# Patient Record
Sex: FEMALE | Race: WHITE | NOT HISPANIC OR LATINO | Employment: OTHER | URBAN - METROPOLITAN AREA
[De-identification: names, ages, dates, MRNs, and addresses within clinical notes are randomized per-mention and may not be internally consistent; named-entity substitution may affect disease eponyms.]

---

## 2017-08-21 ENCOUNTER — HOSPITAL ENCOUNTER (EMERGENCY)
Facility: CLINIC | Age: 42
Discharge: HOME | End: 2017-08-21
Attending: EMERGENCY MEDICINE | Admitting: EMERGENCY MEDICINE
Payer: MEDICAID

## 2017-08-21 VITALS
SYSTOLIC BLOOD PRESSURE: 138 MMHG | TEMPERATURE: 97.8 F | DIASTOLIC BLOOD PRESSURE: 95 MMHG | HEIGHT: 64 IN | RESPIRATION RATE: 16 BRPM | HEART RATE: 61 BPM | OXYGEN SATURATION: 97 % | WEIGHT: 170 LBS | BODY MASS INDEX: 29.02 KG/M2

## 2017-08-21 DIAGNOSIS — R42 VERTIGO: ICD-10-CM

## 2017-08-21 PROCEDURE — 99285 EMERGENCY DEPT VISIT HI MDM: CPT | Mod: 25

## 2017-08-21 PROCEDURE — 96361 HYDRATE IV INFUSION ADD-ON: CPT

## 2017-08-21 PROCEDURE — 96374 THER/PROPH/DIAG INJ IV PUSH: CPT

## 2017-08-21 PROCEDURE — 25000128 H RX IP 250 OP 636: Performed by: EMERGENCY MEDICINE

## 2017-08-21 PROCEDURE — 25000131 ZZH RX MED GY IP 250 OP 636 PS 637: Performed by: EMERGENCY MEDICINE

## 2017-08-21 RX ORDER — MECLIZINE HYDROCHLORIDE 25 MG/1
25 TABLET ORAL EVERY 6 HOURS PRN
Qty: 30 TABLET | Refills: 1 | Status: SHIPPED | OUTPATIENT
Start: 2017-08-21 | End: 2019-06-06

## 2017-08-21 RX ORDER — GLATIRAMER ACETATE 20 MG/ML
40 INJECTION, SOLUTION SUBCUTANEOUS
COMMUNITY
End: 2019-06-06

## 2017-08-21 RX ORDER — MECLIZINE HYDROCHLORIDE 25 MG/1
25 TABLET ORAL ONCE
Status: COMPLETED | OUTPATIENT
Start: 2017-08-21 | End: 2017-08-21

## 2017-08-21 RX ORDER — DIAZEPAM 10 MG/2ML
5 INJECTION, SOLUTION INTRAMUSCULAR; INTRAVENOUS ONCE
Status: COMPLETED | OUTPATIENT
Start: 2017-08-21 | End: 2017-08-21

## 2017-08-21 RX ADMIN — DIAZEPAM 5 MG: 5 INJECTION, SOLUTION INTRAMUSCULAR; INTRAVENOUS at 12:00

## 2017-08-21 RX ADMIN — MECLIZINE 25 MG: 25 TABLET ORAL at 11:21

## 2017-08-21 RX ADMIN — SODIUM CHLORIDE 1000 ML: 9 INJECTION, SOLUTION INTRAVENOUS at 11:22

## 2017-08-21 ASSESSMENT — ENCOUNTER SYMPTOMS
COUGH: 0
SPEECH DIFFICULTY: 0
ABDOMINAL PAIN: 0
VOMITING: 0
DIZZINESS: 1
NAUSEA: 1
FEVER: 0

## 2017-08-21 NOTE — ED PROVIDER NOTES
"  History     Chief Complaint:  Dizziness       HPI   Anmol Silverman is a 41 year old female with a history of MS and anxiety who presents to the emergency department for evaluation of dizziness. The patient reports she has been intermittently dizzy and unsteady on her feet this morning while getting ready for work, explaining that she feels like she is \" constantly moving and has the spins.\" This is exacerbated by movement of her head and bending forward. She additionally has been nauseated with this dizziness, though denies any episodes of vomiting. She denies any recent visional changes, tinnitus, difficulty speaking or swallowing, cough, chest pain, or abdominal pain.    Symptoms began acutely while driving and turning head, severe, comes in by nausea.  No chest pain no diplopia no double vision no dysarthria.    Allergies:  Adhesive Tape    Medications:    Glatiramer    Past Medical History:    Anxiety  Bartholin Gland cyst  Depression   Gestational diabetes mellitus  Hemorrhoids  MS    Past Surgical History:    L4-L5 fusion   Bartholin gland  total hip arthroplasty  Right eye strabismus procedure  Bilateral laboral repairs  Tumor removal of L gluteal     Family History:    Depression  Diabetes  Hypertension    Social History:  Marital Status:   [2]  Current everyday smoker  Negative for alcohol use    Review of Systems   Constitutional: Negative for fever.   HENT: Negative for tinnitus.    Eyes: Negative for visual disturbance.   Respiratory: Negative for cough.    Cardiovascular: Negative for chest pain.   Gastrointestinal: Positive for nausea. Negative for abdominal pain and vomiting.   Neurological: Positive for dizziness. Negative for speech difficulty.   All other systems reviewed and are negative.    Physical Exam   First Vitals:  BP: (!) 138/95  Pulse: 61  Temp: 97.8  F (36.6  C)  Resp: 16  Height: 162.6 cm (5' 4\")  Weight: 77.1 kg (170 lb)  SpO2: 97 %      Physical Exam  General: Pt seen on " [facility], pleasant, cooperative, and alert to conversation  Eyes: Tracking well, clear conj BL  ENT: MMM, neck supple with no TTP  Abd: Soft, non tender, no guarding, no rebound. Non distended  Skin: No skin changes, no edema or rash present to extremities  Neuro: Clear speech and no facial droop.  CN 2 - 12 in tact BL  BUE with SILT and 2PD in tact  BUE with 5/5 motor throughtout  BLE with SILT and 2PD in tact  BLE with 5/5 motor throughout  No clonus  Normal FTN  Normal BRIAN  Ambulatory with no issues.   Psych: Not RIS, no e/o AH/VH      Emergency Department Course   Interventions:  1121  Antivert tablet 25 mg OP  1122  NS 1L IV  1200  Valium 5 mg IV    Emergency Department Course:  Nursing notes and vitals reviewed. 11:08 I performed an exam of the patient as documented above.     1233 I rechecked the patient and discussed her symptoms after the above interventions.     Findings and plan explained to the Patient. Patient discharged home with instructions regarding supportive care, medications, and reasons to return. The importance of close follow-up was reviewed. The patient was prescribed meclizine.     Impression & Plan    Medical Decision Makin-year-old female with likely peripheral vertigo.  Patient has no cardiovascular risk factors or stroke risk factors, no Ds in normal neurological exam with no cerebellar signs.  Also reassuring is the severity of patient's symptoms and the fact that they are exacerbated by movement.  Symptoms ameliorated here with standard anti-vertigo medication.  On my reassessment patient up and about ambulatory and states feels okay going home and follow-up with her regular doctor.  I do not feel that admission and advanced imaging is indicated.  Patient is otherwise negative review of systems, soft abdomen, and symptoms that do fit well with the diagnosis of peripheral vertigo.  I'll discharge very clear return to ED precautions and red flakes when the call  911.    Diagnosis:    ICD-10-CM   1. Vertigo R42       Disposition:  discharged to home    Discharge Medications:   Details   meclizine (ANTIVERT) 25 MG tablet Take 1 tablet (25 mg) by mouth every 6 hours as needed for dizziness, Disp-30 tablet, R-1, Local Print        IRikki, chica serving as a scribe on 8/21/2017 at 11:08 AM to personally document services performed by Dwayne Zafar MD based on my observations and the provider's statements to me.     Rikki Ordonez  8/21/2017    EMERGENCY DEPARTMENT       Dwayne Zafar MD  08/21/17 7638

## 2017-08-21 NOTE — ED AVS SNAPSHOT
Emergency Department    [address] [place] [address]    Phone:  [phone]    Fax:  [phone]                                       Anmol Silverman   MRN: [id number]    Department:   Emergency Department   Date of Visit:  8/21/2017           After Visit Summary Signature Page     I have received my discharge instructions, and my questions have been answered. I have discussed any challenges I see with this plan with the nurse or doctor.    ..........................................................................................................................................  Patient/Patient Representative Signature      ..........................................................................................................................................  Patient Representative Print Name and Relationship to Patient    ..................................................               ................................................  Date                                            Time    ..........................................................................................................................................  Reviewed by Signature/Title    ...................................................              ..............................................  Date                                                            Time

## 2017-08-21 NOTE — DISCHARGE INSTRUCTIONS
Possible Causes of Dizziness or Fainting    Dizziness and fainting can have many causes. Below are some examples of possible causes your healthcare provider will look to rule out.  Benign paroxysmal positional vertigo (BPPV)  BPPV results when calcium crystals inside the inner ear shift into the wrong position. BPPV causes episodes of vertigo (a spinning sensation). Episodes most often happen when the head is moved in a certain way. This is more common in people 65 and older.   Infection or inflammation  The semicircular canals of the ear may become infected or inflamed. In this case, they can send the wrong balance signals. This can cause vertigo.  Meniere disease  Meniere disease happens when there is too much fluid in the semicircular canals. This can cause vertigo. It also can cause hearing problems and buzzing or ringing in the ears (called tinnitus). You may also have a feeling of pressure or fullness in the ear.  Syncope  Syncope is fainting that happens when the brain doesn t get enough oxygen-rich blood. It can be caused by low heart rate or low blood pressure. This is called vasovagal syncope. It can also be caused by sitting or standing up too quickly. This is called orthostatic hypotension. Syncope may also be due to a heart valve problem, an abnormal heart rhythm, or other heart problems. Dizziness can also happen from stroke, hemorrhage in the brain, or other problems in the brain. Your healthcare provider may do certain tests to rule out these conditions.  Other causes  Other causes include:    Medicines. Certain medicines can cause dizziness and even fainting. In some cases, stopping a medicine too quickly can lead to withdrawal symptoms, including dizziness and fainting.    Anxiety. Being anxious can lead to breathing changes, such as hyperventilation. These can lead to dizziness and fainting.  Additional causes for dizziness and fainting also exist. Talk to your healthcare provider for more  information.     Date Last Reviewed: 10/6/2015    [id number] [employer]. [address] [place] [address], [place] [address]. All rights reserved. This information is not intended as a substitute for professional medical care. Always follow your healthcare professional's instructions.

## 2017-08-21 NOTE — ED NOTES
Bed: ED01  Expected date:   Expected time:   Means of arrival:   Comments:  [facility] [place] - 41F dizziness/weakness eta 1046

## 2017-08-21 NOTE — ED AVS SNAPSHOT
Emergency Department    [address] [facility] [address]    Phone:  [phone]    Fax:  [phone]                                       Anmol Silverman   MRN: [id number]    Department:   Emergency Department   Date of Visit:  8/21/2017           Patient Information     Date Of Birth          1975        Your diagnoses for this visit were:     Vertigo        You were seen by Dwayne Zafar MD.      Follow-up Information     Follow up with Enrrique, Fanny Peters In 3 days.    Why:  As needed    Contact information:    [address] [facility] [address]  [phone]          Follow up with  Emergency Department.    Specialty:  EMERGENCY MEDICINE    Why:  If symptoms worsen    Contact information:    [address] [facility] [id number] [phone]        Discharge Instructions         Possible Causes of Dizziness or Fainting    Dizziness and fainting can have many causes. Below are some examples of possible causes your healthcare provider will look to rule out.  Benign paroxysmal positional vertigo (BPPV)  BPPV results when calcium crystals inside the inner ear shift into the wrong position. BPPV causes episodes of vertigo (a spinning sensation). Episodes most often happen when the head is moved in a certain way. This is more common in people 65 and older.   Infection or inflammation  The semicircular canals of the ear may become infected or inflamed. In this case, they can send the wrong balance signals. This can cause vertigo.  Meniere disease  Meniere disease happens when there is too much fluid in the semicircular canals. This can cause vertigo. It also can cause hearing problems and buzzing or ringing in the ears (called tinnitus). You may also have a feeling of pressure or fullness in the ear.  Syncope  Syncope is fainting that happens when the brain doesn t get enough oxygen-rich blood. It can be caused by low heart rate or low  blood pressure. This is called vasovagal syncope. It can also be caused by sitting or standing up too quickly. This is called orthostatic hypotension. Syncope may also be due to a heart valve problem, an abnormal heart rhythm, or other heart problems. Dizziness can also happen from stroke, hemorrhage in the brain, or other problems in the brain. Your healthcare provider may do certain tests to rule out these conditions.  Other causes  Other causes include:    Medicines. Certain medicines can cause dizziness and even fainting. In some cases, stopping a medicine too quickly can lead to withdrawal symptoms, including dizziness and fainting.    Anxiety. Being anxious can lead to breathing changes, such as hyperventilation. These can lead to dizziness and fainting.  Additional causes for dizziness and fainting also exist. Talk to your healthcare provider for more information.     Date Last Reviewed: 10/6/2015    3201-9941 [employer]. [address] [place] [address]. All rights reserved. This information is not intended as a substitute for professional medical care. Always follow your healthcare professional's instructions.          24 Hour Appointment Hotline       To make an appointment at any [facility], call [phone] ([phone]). If you don't have a family doctor or clinic, we will help you find one. [facility] clinics are conveniently located to serve the needs of you and your family.             Review of your medicines      START taking        Dose / Directions Last dose taken    meclizine 25 MG tablet   Commonly known as:  ANTIVERT   Dose:  25 mg   Quantity:  30 tablet        Take 1 tablet (25 mg) by mouth every 6 hours as needed for dizziness   Refills:  1          Our records show that you are taking the medicines listed below. If these are incorrect, please call your family doctor or clinic.        Dose / Directions Last dose taken    blood glucose calibration solution    Quantity:  1 Bottle        Use to calibrate blood glucose monitor as needed as directed.   Refills:  0        glatiramer 20 MG/ML injection   Commonly known as:  COPAXONE   Dose:  40 mg        40 mg 3x per week   Refills:  0        KETO-DIASTIX Strp   Dose:  1 strip   Quantity:  50 each        1 strip by In Vitro route daily as needed   Refills:  prn        prenatal multivitamin plus iron 27-0.8 MG Tabs per tablet   Dose:  1 tablet        Take 1 tablet by mouth daily   Refills:  0        thin lancets   Commonly known as:  NO BRAND SPECIFIED   Quantity:  1 Box        Use to test blood sugar 4 times daily or as directed.   Refills:  prn                Prescriptions were sent or printed at these locations (1 Prescription)                   Other Prescriptions                Printed at Department/Unit printer (1 of 1)         meclizine (ANTIVERT) 25 MG tablet                Orders Needing Specimen Collection     None      Pending Results     No orders found from 8/19/2017 to 8/22/2017.            Pending Culture Results     No orders found from 8/19/2017 to 8/22/2017.            Pending Results Instructions     If you had any lab results that were not finalized at the time of your Discharge, you can call the ED Lab Result RN at [phone]. You will be contacted by this team for any positive Lab results or changes in treatment. The nurses are available 7 days a week from 10A to 6:30P.  You can leave a message 24 hours per day and they will return your call.        Test Results From Your Hospital Stay               Clinical Quality Measure: Blood Pressure Screening     Your blood pressure was checked while you were in the emergency department today. The last reading we obtained was  BP: (!) 138/95 . Please read the guidelines below about what these numbers mean and what you should do about them.  If your systolic blood pressure (the top number) is less than 120 and your diastolic blood pressure (the bottom number) is  "less than 80, then your blood pressure is normal. There is nothing more that you need to do about it.  If your systolic blood pressure (the top number) is 120-139 or your diastolic blood pressure (the bottom number) is 80-89, your blood pressure may be higher than it should be. You should have your blood pressure rechecked within a year by a primary care provider.  If your systolic blood pressure (the top number) is 140 or greater or your diastolic blood pressure (the bottom number) is 90 or greater, you may have high blood pressure. High blood pressure is treatable, but if left untreated over time it can put you at risk for heart attack, stroke, or kidney failure. You should have your blood pressure rechecked by a primary care provider within the next 4 weeks.  If your provider in the emergency department today gave you specific instructions to follow-up with your doctor or provider even sooner than that, you should follow that instruction and not wait for up to 4 weeks for your follow-up visit.        Thank you for choosing [facility]       Thank you for choosing [facility] for your care. Our goal is always to provide you with excellent care. Hearing back from our patients is one way we can continue to improve our services. Please take a few minutes to complete the written survey that you may receive in the mail after you visit with us. Thank you!        [employer] Information     [employer] lets you send messages to your doctor, view your test results, renew your prescriptions, schedule appointments and more. To sign up, go to [email or web address] . Click on \"Log in\" on the left side of the screen, which will take you to the Welcome page. Then click on \"Sign up Now\" on the right side of the page.     You will be asked to enter the access code listed below, as well as some personal information. Please follow the directions to create your username and password.     Your access code is: [email or web address]  Expires: 11/19/2017 " 12:37 PM     Your access code will  in 90 days. If you need help or a new code, please call your [place] clinic or [phone].        Care EveryWhere ID     This is your Care EveryWhere ID. This could be used by other organizations to access your [place] medical records  [id number]        Equal Access to Services     LIANA LINO : Yuan Eden, larisa pappas, jomar adams, chetan sawant . So [facility] [phone].    ATENCIÓN: Si habla español, tiene a fernandes disposición servicios gratuitos de asistencia lingüística. Llame al [phone].    We comply with applicable federal civil rights laws and [place] laws. We do not discriminate on the basis of race, color, national origin, age, disability sex, sexual orientation or gender identity.            After Visit Summary       This is your record. Keep this with you and show to your community pharmacist(s) and doctor(s) at your next visit.

## 2019-06-06 ENCOUNTER — HOSPITAL ENCOUNTER (INPATIENT)
Facility: CLINIC | Age: 44
LOS: 4 days | Discharge: HOME | DRG: 885 | End: 2019-06-10
Attending: EMERGENCY MEDICINE | Admitting: PSYCHIATRY & NEUROLOGY
Payer: COMMERCIAL

## 2019-06-06 ENCOUNTER — APPOINTMENT (OUTPATIENT)
Dept: GENERAL RADIOLOGY | Facility: CLINIC | Age: 44
DRG: 885 | End: 2019-06-06
Attending: EMERGENCY MEDICINE
Payer: COMMERCIAL

## 2019-06-06 DIAGNOSIS — T50.902A INTENTIONAL DRUG OVERDOSE, INITIAL ENCOUNTER (H): ICD-10-CM

## 2019-06-06 DIAGNOSIS — J01.10 ACUTE FRONTAL SINUSITIS, RECURRENCE NOT SPECIFIED: Primary | ICD-10-CM

## 2019-06-06 DIAGNOSIS — F32.A DEPRESSION WITH SUICIDAL IDEATION: ICD-10-CM

## 2019-06-06 DIAGNOSIS — R45.851 DEPRESSION WITH SUICIDAL IDEATION: ICD-10-CM

## 2019-06-06 LAB
ALBUMIN SERPL-MCNC: 3.6 G/DL (ref 3.4–5)
ALP SERPL-CCNC: 82 U/L (ref 40–150)
ALT SERPL W P-5'-P-CCNC: 21 U/L (ref 0–50)
AMPHETAMINES UR QL SCN: NEGATIVE
ANION GAP SERPL CALCULATED.3IONS-SCNC: 5 MMOL/L (ref 3–14)
APAP SERPL-MCNC: <2 MG/L (ref 10–20)
AST SERPL W P-5'-P-CCNC: 13 U/L (ref 0–45)
BARBITURATES UR QL: NEGATIVE
BENZODIAZ UR QL: NEGATIVE
BILIRUB SERPL-MCNC: 0.7 MG/DL (ref 0.2–1.3)
BUN SERPL-MCNC: 21 MG/DL (ref 7–30)
CALCIUM SERPL-MCNC: 8.6 MG/DL (ref 8.5–10.1)
CANNABINOIDS UR QL SCN: POSITIVE
CHLORIDE SERPL-SCNC: 110 MMOL/L (ref 94–109)
CO2 SERPL-SCNC: 26 MMOL/L (ref 20–32)
COCAINE UR QL: NEGATIVE
CREAT SERPL-MCNC: 0.64 MG/DL (ref 0.52–1.04)
ERYTHROCYTE [DISTWIDTH] IN BLOOD BY AUTOMATED COUNT: 12.6 % (ref 10–15)
GFR SERPL CREATININE-BSD FRML MDRD: >90 ML/MIN/{1.73_M2}
GLUCOSE SERPL-MCNC: 100 MG/DL (ref 70–99)
HCG UR QL: NEGATIVE
HCT VFR BLD AUTO: 41.9 % (ref 35–47)
HGB BLD-MCNC: 14.2 G/DL (ref 11.7–15.7)
INTERPRETATION ECG - MUSE: NORMAL
MCH RBC QN AUTO: 30.3 PG (ref 26.5–33)
MCHC RBC AUTO-ENTMCNC: 33.9 G/DL (ref 31.5–36.5)
MCV RBC AUTO: 89 FL (ref 78–100)
OPIATES UR QL SCN: NEGATIVE
PCP UR QL SCN: NEGATIVE
PLATELET # BLD AUTO: 271 10E9/L (ref 150–450)
POTASSIUM SERPL-SCNC: 3.7 MMOL/L (ref 3.4–5.3)
PROT SERPL-MCNC: 6.9 G/DL (ref 6.8–8.8)
RBC # BLD AUTO: 4.69 10E12/L (ref 3.8–5.2)
SODIUM SERPL-SCNC: 141 MMOL/L (ref 133–144)
TSH SERPL DL<=0.005 MIU/L-ACNC: 1.04 MU/L (ref 0.4–4)
WBC # BLD AUTO: 11 10E9/L (ref 4–11)

## 2019-06-06 PROCEDURE — 93005 ELECTROCARDIOGRAM TRACING: CPT

## 2019-06-06 PROCEDURE — 81025 URINE PREGNANCY TEST: CPT | Performed by: EMERGENCY MEDICINE

## 2019-06-06 PROCEDURE — 25000132 ZZH RX MED GY IP 250 OP 250 PS 637: Performed by: PSYCHIATRY & NEUROLOGY

## 2019-06-06 PROCEDURE — 85027 COMPLETE CBC AUTOMATED: CPT | Performed by: PSYCHIATRY & NEUROLOGY

## 2019-06-06 PROCEDURE — 80053 COMPREHEN METABOLIC PANEL: CPT | Performed by: EMERGENCY MEDICINE

## 2019-06-06 PROCEDURE — 12400000 ZZH R&B MH

## 2019-06-06 PROCEDURE — 80329 ANALGESICS NON-OPIOID 1 OR 2: CPT | Performed by: EMERGENCY MEDICINE

## 2019-06-06 PROCEDURE — 71046 X-RAY EXAM CHEST 2 VIEWS: CPT

## 2019-06-06 PROCEDURE — 90791 PSYCH DIAGNOSTIC EVALUATION: CPT

## 2019-06-06 PROCEDURE — 85027 COMPLETE CBC AUTOMATED: CPT | Performed by: EMERGENCY MEDICINE

## 2019-06-06 PROCEDURE — 80307 DRUG TEST PRSMV CHEM ANLYZR: CPT | Performed by: EMERGENCY MEDICINE

## 2019-06-06 PROCEDURE — 84443 ASSAY THYROID STIM HORMONE: CPT | Performed by: EMERGENCY MEDICINE

## 2019-06-06 PROCEDURE — 99285 EMERGENCY DEPT VISIT HI MDM: CPT | Mod: 25

## 2019-06-06 RX ORDER — SERTRALINE HYDROCHLORIDE 100 MG/1
100 TABLET, FILM COATED ORAL DAILY
Status: DISCONTINUED | OUTPATIENT
Start: 2019-06-07 | End: 2019-06-10 | Stop reason: HOSPADM

## 2019-06-06 RX ORDER — GABAPENTIN 100 MG/1
200 CAPSULE ORAL AT BEDTIME
Status: DISCONTINUED | OUTPATIENT
Start: 2019-06-06 | End: 2019-06-10 | Stop reason: HOSPADM

## 2019-06-06 RX ORDER — GABAPENTIN 100 MG/1
200 CAPSULE ORAL AT BEDTIME
COMMUNITY

## 2019-06-06 RX ORDER — BENZONATATE 100 MG/1
100 CAPSULE ORAL 3 TIMES DAILY PRN
Status: DISCONTINUED | OUTPATIENT
Start: 2019-06-06 | End: 2019-06-10 | Stop reason: HOSPADM

## 2019-06-06 RX ORDER — SERTRALINE HYDROCHLORIDE 100 MG/1
100 TABLET, FILM COATED ORAL AT BEDTIME
COMMUNITY

## 2019-06-06 RX ORDER — IBUPROFEN 400 MG/1
800 TABLET, FILM COATED ORAL EVERY 8 HOURS PRN
Status: DISCONTINUED | OUTPATIENT
Start: 2019-06-06 | End: 2019-06-10 | Stop reason: HOSPADM

## 2019-06-06 RX ORDER — HYDROXYZINE HYDROCHLORIDE 25 MG/1
25 TABLET, FILM COATED ORAL EVERY 4 HOURS PRN
Status: DISCONTINUED | OUTPATIENT
Start: 2019-06-06 | End: 2019-06-06

## 2019-06-06 RX ORDER — IBUPROFEN 400 MG/1
400 TABLET, FILM COATED ORAL EVERY 6 HOURS PRN
Status: DISCONTINUED | OUTPATIENT
Start: 2019-06-06 | End: 2019-06-06 | Stop reason: DRUGHIGH

## 2019-06-06 RX ORDER — GLATIRAMER ACETATE 20 MG/ML
40 INJECTION, SOLUTION SUBCUTANEOUS
Status: DISCONTINUED | OUTPATIENT
Start: 2019-06-07 | End: 2019-06-10 | Stop reason: HOSPADM

## 2019-06-06 RX ORDER — VARENICLINE TARTRATE 1 MG/1
1 TABLET, FILM COATED ORAL 2 TIMES DAILY
COMMUNITY
End: 2019-06-06

## 2019-06-06 RX ORDER — DIPHENHYDRAMINE HCL 25 MG
25-50 CAPSULE ORAL EVERY 6 HOURS PRN
Status: DISCONTINUED | OUTPATIENT
Start: 2019-06-06 | End: 2019-06-10 | Stop reason: HOSPADM

## 2019-06-06 RX ORDER — MIRTAZAPINE 7.5 MG/1
7.5 TABLET, FILM COATED ORAL AT BEDTIME
Status: DISCONTINUED | OUTPATIENT
Start: 2019-06-06 | End: 2019-06-07

## 2019-06-06 RX ORDER — HYDROXYZINE HYDROCHLORIDE 25 MG/1
25-50 TABLET, FILM COATED ORAL EVERY 6 HOURS PRN
Status: DISCONTINUED | OUTPATIENT
Start: 2019-06-06 | End: 2019-06-10 | Stop reason: HOSPADM

## 2019-06-06 RX ORDER — GLATIRAMER ACETATE 20 MG/ML
40 INJECTION, SOLUTION SUBCUTANEOUS
COMMUNITY

## 2019-06-06 RX ADMIN — IBUPROFEN 400 MG: 400 TABLET ORAL at 19:52

## 2019-06-06 RX ADMIN — DIPHENHYDRAMINE HYDROCHLORIDE 25 MG: 25 CAPSULE ORAL at 21:50

## 2019-06-06 RX ADMIN — IBUPROFEN 400 MG: 400 TABLET ORAL at 21:49

## 2019-06-06 RX ADMIN — GABAPENTIN 200 MG: 100 CAPSULE ORAL at 19:53

## 2019-06-06 RX ADMIN — MIRTAZAPINE 7.5 MG: 7.5 TABLET ORAL at 19:54

## 2019-06-06 ASSESSMENT — ACTIVITIES OF DAILY LIVING (ADL)
SWALLOWING: 0-->SWALLOWS FOODS/LIQUIDS WITHOUT DIFFICULTY
RETIRED_EATING: 0-->INDEPENDENT
BATHING: 0-->INDEPENDENT
DRESS: 0-->INDEPENDENT
RETIRED_COMMUNICATION: 0-->UNDERSTANDS/COMMUNICATES WITHOUT DIFFICULTY
TOILETING: 0-->INDEPENDENT

## 2019-06-06 ASSESSMENT — MIFFLIN-ST. JEOR
SCORE: 1392.06
SCORE: 1388.44

## 2019-06-06 ASSESSMENT — ENCOUNTER SYMPTOMS: FATIGUE: 1

## 2019-06-06 NOTE — PHARMACY-ADMISSION MEDICATION HISTORY
Admission medication history interview status for the 6/6/2019  admission is complete. See EPIC admission navigator for prior to admission medications     Medication history source reliability:Good    Actions taken by pharmacist (provider contacted, etc):None     Additional medication history information not noted on PTA med list :tizanadine was old rx from [facility]     Medication reconciliation/reorder completed by provider prior to medication history? No    Time spent in this activity: 20min, review of health care records and patient / family interview     Prior to Admission medications    Medication Sig Last Dose Taking? Auth Provider   gabapentin (NEURONTIN) 100 MG capsule Take 200 mg by mouth At Bedtime 6/5/2019 at Unknown time Yes Unknown, Entered By History   glatiramer (COPAXONE) 20 MG/ML injection Inject 40 mg Subcutaneous three times a week Monday , Wednesday, Friday 6/5/2019 at Unknown time Yes Unknown, Entered By History   sertraline (ZOLOFT) 100 MG tablet Take 100 mg by mouth At Bedtime 6/5/2019 at Unknown time Yes Unknown, Entered By History   vitamin D3 (CHOLECALCIFEROL) 31793 units (250 mcg) capsule Take 1 capsule by mouth daily 6/5/2019 at Unknown time Yes Unknown, Entered By History

## 2019-06-06 NOTE — ED NOTES
I spoke with the patient and she has stated that she is willing to be admitted on a voluntary basis if I agree to not put her on a 72-hour hold.   So, the patient is now voluntarily going to be admitted.     Nestor Arreaga MD  06/06/19 4435

## 2019-06-06 NOTE — ED NOTES
Bed: ED19  Expected date:   Expected time:   Means of arrival:   Comments:  [facility] [place] - [place] - 43 F overdose eta 1047

## 2019-06-06 NOTE — ED NOTES
Pt reports taking an est. Of 20 4mg tizanidine around 10am. Poison control called; suggested to watch pt for 4 hr with concerns of hypotension, decrease LOC, resp depression and bradycardia; pt on monitor and stable

## 2019-06-06 NOTE — ED PROVIDER NOTES
"  History     Chief Complaint:  Drug overdose    HPI   Anmol Silverman is a 43 year old female with a history of anxiety and depression who presents with drug overdose. The patient states her sister called for EMS after she took up to 20 4 mg tizanidine less than an hour prior to evaluation. The patient admits to suicidal ideation and self-harm in the past. The patient admits to symptom of drowsiness.    Allergies:  Adhesive tape - rash    Medications:    Copaxone  Antivert  Prenatal multivitamin w/ iron  Gabapentin  Zoloft  tizanidine  viatin  Robitussin  Chantix    Past Medical History:    Anxiety  Bartholin gland cyst  Depression  Gestational diabetes mellitus  Hemorrhoids  Multiple sclerosis    Vitamin D deficiency    Past Surgical History:    L4-L5 fusion  Bartholin gland  C arthroscopy hip w/ labral repair - bilateral  R eye strabismus  Bilateral laboral repairs  L gluteal, benign removal    Family History:    CAD  Depression  Diabetes  HTN    Social History:  Smoking status: Yes - 0.25 ppd  Alcohol use: No  The patient presents to the emergency department by herself.  Marital Status:   [2]    Review of Systems   Constitutional: Positive for fatigue.   Psychiatric/Behavioral: Positive for self-injury and suicidal ideas.   All other systems reviewed and are negative.      Physical Exam     Patient Vitals for the past 24 hrs:   BP Temp Temp src Heart Rate Resp SpO2 Height Weight   19 1055 129/89 98.3  F (36.8  C) Oral 60 16 97 % 1.626 m (5' 4\") 74.8 kg (165 lb)     Physical Exam  Eyes:  The pupils are equal and round    Conjunctivae and sclerae are normal  ENT:    The nose is normal    Pinnae are normal  CV:  Bradycardic and regular     No edema  Resp:  Lungs are clear    Non-labored    No rales    No wheezing   GI:  Abdomen is soft and non-tender, there is no rigidity    No distension    No rebound tenderness   MS:  Normal muscular tone    No asymmetric leg swelling  Skin:  No rash or acute " skin lesions noted  Neuro:   Awake, alert.      Speech is normal and fluent.    Face is symmetric.     Moves all extremities    No clonus  Psych:  Tearful, endorses suicide attempt. Denies hallucinations    Emergency Department Course   ECG (11:22:52):  Rate 55 bpm. MD interval 170. QRS duration 96. QT/QTc 470/449. P-R-T axes 55 69 43. Sinus bradycardia. Otherwise normal ECG. Interpreted at 1130 by Osiel Coreas MD.    Laboratory:  CMP: Chloride 110 (H), Glucose 100 (H), o/w WNL (Creatinine 0.64)    Acetaminophen <2    Interventions:  None    Emergency Department Course:  Past medical records, nursing notes, and vitals reviewed.  1128: I performed an exam of the patient and obtained history, as documented above.    IV inserted and blood drawn.    Patient signed out awaiting disposition.    Impression & Plan    Medical Decision Making:  Anmol Silverman is a 43 year old female with history of depression who presents the emergency department after an attempted overdose as suicide attempt.  She took up to 24 mg tizanidine tablets.  This occurred about an hour prior to arrival.  Heart rate is slightly low when compared to baseline.  Blood pressure is normal.  She is awake and alert.  She is observed here in the emergency department and does well.  She is evaluated by the mental health  who also agreed with admission.  Patient is currently voluntary but if were to try to leave it would require a 72-hour hold.  She signed out to my partner awaiting disposition.    Diagnosis:    ICD-10-CM    1. Intentional drug overdose, initial encounter (H) T50.902A        Disposition:  Patient signed-out    Discharge Medications:     Medication List      There are no discharge medications for this visit.           Daniella Martin  6/6/2019    EMERGENCY DEPARTMENT  Scribe Disclosure:  Daniella STEEL am serving as a scribe at 11:28 AM on 6/6/2019 to document services personally performed by Osiel Coreas MD  based on my observations and the provider's statements to me.        Osiel Coreas MD  06/06/19 1823

## 2019-06-06 NOTE — ED NOTES
I received this patient in signout from Dr. Coreas at 2 PM.  The patient had intentionally overdose on a medication that required to 4 hours of observation here in the ED before she was medically cleared.  I was told the patient was voluntary for admission to the psychiatric unit because of the intentional overdose.  However, during the time that was needed in order to acquire a psychiatric bed, the patient has changed her mind and is no longer voluntarily willing to be admitted.  Was told that if this were to happen the patient would require a 72-hour hold.    The patient will be placed on a 72-hour hold per the report of Dr. Coreas.  She will be informed of this initiation of a 72-hour hold.     Nestor Arreaga MD  06/06/19 6099

## 2019-06-07 PROCEDURE — 25000132 ZZH RX MED GY IP 250 OP 250 PS 637: Performed by: PHYSICIAN ASSISTANT

## 2019-06-07 PROCEDURE — 12400000 ZZH R&B MH

## 2019-06-07 PROCEDURE — 99222 1ST HOSP IP/OBS MODERATE 55: CPT | Performed by: PHYSICIAN ASSISTANT

## 2019-06-07 PROCEDURE — 25000131 ZZH RX MED GY IP 250 OP 636 PS 637: Performed by: PSYCHIATRY & NEUROLOGY

## 2019-06-07 PROCEDURE — 90853 GROUP PSYCHOTHERAPY: CPT

## 2019-06-07 PROCEDURE — 25000132 ZZH RX MED GY IP 250 OP 250 PS 637: Performed by: PSYCHIATRY & NEUROLOGY

## 2019-06-07 RX ORDER — AZITHROMYCIN 250 MG/1
250 TABLET, FILM COATED ORAL DAILY
Status: DISCONTINUED | OUTPATIENT
Start: 2019-06-08 | End: 2019-06-10 | Stop reason: HOSPADM

## 2019-06-07 RX ORDER — AZITHROMYCIN 250 MG/1
500 TABLET, FILM COATED ORAL ONCE
Status: COMPLETED | OUTPATIENT
Start: 2019-06-07 | End: 2019-06-07

## 2019-06-07 RX ORDER — CETIRIZINE HYDROCHLORIDE 5 MG/1
5 TABLET ORAL DAILY
Status: DISCONTINUED | OUTPATIENT
Start: 2019-06-07 | End: 2019-06-10 | Stop reason: HOSPADM

## 2019-06-07 RX ORDER — VENLAFAXINE HYDROCHLORIDE 75 MG/1
75 CAPSULE, EXTENDED RELEASE ORAL
Status: DISCONTINUED | OUTPATIENT
Start: 2019-06-08 | End: 2019-06-10 | Stop reason: HOSPADM

## 2019-06-07 RX ORDER — GUAIFENESIN/DEXTROMETHORPHAN 100-10MG/5
5 SYRUP ORAL EVERY 4 HOURS PRN
Status: DISCONTINUED | OUTPATIENT
Start: 2019-06-07 | End: 2019-06-10 | Stop reason: HOSPADM

## 2019-06-07 RX ADMIN — GABAPENTIN 200 MG: 100 CAPSULE ORAL at 21:00

## 2019-06-07 RX ADMIN — CETIRIZINE HYDROCHLORIDE 5 MG: 5 TABLET ORAL at 18:27

## 2019-06-07 RX ADMIN — GUAIFENESIN AND DEXTROMETHORPHAN 5 ML: 100; 10 SYRUP ORAL at 23:18

## 2019-06-07 RX ADMIN — AZITHROMYCIN 500 MG: 250 TABLET, FILM COATED ORAL at 18:27

## 2019-06-07 RX ADMIN — GUAIFENESIN AND DEXTROMETHORPHAN 5 ML: 100; 10 SYRUP ORAL at 19:07

## 2019-06-07 RX ADMIN — CHOLECALCIFEROL CAP 125 MCG (5000 UNIT) 10000 UNITS: 125 CAP at 08:20

## 2019-06-07 RX ADMIN — IBUPROFEN 800 MG: 400 TABLET ORAL at 19:06

## 2019-06-07 RX ADMIN — GLATIRAMER ACETATE 40 MG: 20 INJECTION, SOLUTION SUBCUTANEOUS at 10:14

## 2019-06-07 RX ADMIN — SERTRALINE HYDROCHLORIDE 100 MG: 100 TABLET ORAL at 08:20

## 2019-06-07 ASSESSMENT — ACTIVITIES OF DAILY LIVING (ADL)
LAUNDRY: WITH SUPERVISION
HYGIENE/GROOMING: INDEPENDENT
ORAL_HYGIENE: INDEPENDENT
DRESS: INDEPENDENT

## 2019-06-07 NOTE — PROGRESS NOTES
06/06/19 1953   Patient Belongings   Did you bring any home meds/supplements to the hospital?  No   Patient Belongings locker   Patient Belongings Put in Hospital Secure Location (Security or Locker, etc.) clothing;other (see comments)   Belongings Search Yes   Clothing Search Yes   Second Staff Angie   Comment All belongings searched and placed in locker     Shorts w/Strings  Tank Top  Bra  Sandals  Glasses    Admission:  I am responsible for any personal items that are not sent to the safe or pharmacy. [facility] is not responsible for loss, theft or damage of any property in my possession.        Patient Signature: ___________________________________________      Date/Time:__________________________     Staff Signature: __________________________________      Date/Time:__________________________     [facility] Staff person, if patient is unable/unwilling to sign:      __________________________________________________________      Date/Time: __________________________        Discharge:  [facility] has returned all of my personal belongings:     Patient Signature: ________________________________________     Date/Time: ____________________________________     Staff Signature: ______________________________________     Date/Time:_____________________________________

## 2019-06-07 NOTE — PROGRESS NOTES
Patient brought in on a  Hold by Officer Ishmael cervantes # [id number] file # [id number] of the [place] [employer] [phone]. If patient is placed on a 72 hold and is being discharged before it expires this department will need to be called when a discharge order is placed and before the discharge and then fully documented in Epic.

## 2019-06-07 NOTE — H&P
[facility]    History and Physical  Hospitalist       Date of Admission:  6/6/2019  Date of Service (when I saw the patient): 06/07/19    Assessment & Plan   Anmol Silverman is a 43 year old female with a history of multiple sclerosis, anxiety and depression who presented to the ED with an intentional Zanaflex overdose.  She was voluntarily admitted to inpatient psychiatry for further evaluation and psychiatric stabilization.    Adjustment disorder with mixed anxiety and depressed mood.   Major depressive disorder, single episode, severe without psychotic features.   Cannabis use  --Patient monitored in the ED for adequate period of time.  Vital signs stable.   --Will ask for psychiatry to evaluate the patient and will defer to their management.    Multiple sclerosis.   Managed by Dr. Derek Holm at [facility] in [place].  No current flare.  --Continue PTA Copaxone    Acute bronchitis with possible sinusitis.  Pt reports 3 weeks of cough and sinus congestive symptoms.  CXR obtained in ED negative.  She has significant sinus congestion, headache, and subjective fevers.  Coughing frequently, states difficult to produce sputum.  Denies shortness of breath or wheezing.  --Given duration of symptoms and lack of improvement, will treat with 5-day course of azithromycin 500 once followed by 250 mg x 4 days.  --Robitussin-DM as needed ordered  --Zyrtec ordered.  Would avoid Sudafed given her psychiatric concerns.  --Ibuprofen as needed  --Supportive cares    Vitamin D deficiency.   --Continue vitamin D supplementation.  F/u with PCP.    Chronic hip pain.   --Ibuprofen prn      DVT Prophylaxis: Ambulate every shift  Code Status: Full Code by default    Disposition: Expected discharge pending psychiatry recommendations.    The Hospitalist service will sign off at this time.  Please contact us with any questions or concerns.    Marquise Min    Primary Care Physician   Karyna FLORES  [place]    Chief Complaint   Intentional overdose, suicidal ideation    History of Present Illness   Anmol Silverman is a 43 year old female with a history of multiple sclerosis, anxiety and depression who presented to the ED with a drug overdose. The patient states her sister called for EMS after she took up to 20 4 mg tizanidine less than an hour prior to evaluation. The patient admits to suicidal ideation and self-harm in the past. On arrival the patient was somnolent but otherwise stable.     Pt feels overwhelmed and hopeless. Struggling with MS, 2 jobs, caring for a 3 year old as well as 5 other children. Reports she receives very little support from her  which has caused strain in their relationship. She has no current OP mental health providers. Pt feels she would benefit from a therapist. There is family history of suicide-brother shot himself.     Patient is seen on the stepdown unit, appears calm on my arrival.  She is grateful to be seen as she is complaining of 3 weeks of cough and upper respiratory symptoms for which she had plan to see her PCP today.  She states she has significant sinus congestion with persistent frontal headache, persistent nonproductive cough, subjective fevers.  Denies wheezing or shortness of breath.  Denies abdominal pain, nausea, vomiting, diarrhea.  Has tried multiple medications at home including Tessalon, Robitussin, Benadryl, Zyrtec, and Sudafed without relief.  She otherwise denies any other symptoms such as dizziness, syncope, palpitations, chest pain, sore throat, focal weakness or numbness, rash.  Chest x-ray reviewed and negative.  Admission lab work including CBC, CMP, and TSH are unremarkable.      Past Medical History    1.  Multiple sclerosis.   2.  Gestational diabetes.   3.  Depression.   4.  Anxiety.   5.  Bartholin gland cyst.   6.  Vitamin D deficiency.    Past Surgical History   Past Surgical History:   Procedure Laterality Date     ANALYSIS, TUMOR  Left     glut     BACK SURGERY      L4-L5 Fusion     bartholin gland        C ARTHROSCOPY HIP W/LABRAL REPAIR Bilateral      EYE SURGERY      R eye, strabismus     ORTHOPEDIC SURGERY      bilateral laboral repairs     tumor removal      L gluteal, benign       Prior to Admission Medications   Prior to Admission Medications   Prescriptions Last Dose Informant Patient Reported? Taking?   gabapentin (NEURONTIN) 100 MG capsule 6/5/2019 at Unknown time  Yes Yes   Sig: Take 200 mg by mouth At Bedtime   glatiramer (COPAXONE) 20 MG/ML injection 6/5/2019 at Unknown time  Yes Yes   Sig: Inject 40 mg Subcutaneous three times a week Monday , Wednesday, Friday   sertraline (ZOLOFT) 100 MG tablet 6/5/2019 at Unknown time  Yes Yes   Sig: Take 100 mg by mouth At Bedtime   vitamin D3 (CHOLECALCIFEROL) 84800 units (250 mcg) capsule 6/5/2019 at Unknown time  Yes Yes   Sig: Take 1 capsule by mouth daily      Facility-Administered Medications: None     Allergies   Allergies   Allergen Reactions     Adhesive Tape Rash     from some bandages       Social History   Pt reports smoking marijuana.  She denies alcohol use.  She quit smoking tobacco in January after 30 years of smoking.    Family History   I have reviewed this patient's family history and updated it with pertinent information if needed.   Father with depression and diabetes.  Mother with hypertension.  Brother with depression and suicide.    Review of Systems   The 10 point Review of Systems is negative other than noted in the HPI.    Physical Exam   Temp: 97.9  F (36.6  C) Temp src: Oral BP: 136/76 Pulse: 58 Heart Rate: 60 Resp: 16 SpO2: 97 % O2 Device: None (Room air)    Vital Signs with Ranges  Temp:  [97.6  F (36.4  C)-98.3  F (36.8  C)] 97.9  F (36.6  C)  Pulse:  [58-60] 58  Heart Rate:  [49-60] 60  Resp:  [14-28] 16  BP: (124-136)/(76-89) 136/76  SpO2:  [96 %-98 %] 97 %  165 lbs 12.8 oz    GENERAL: Alert, oriented to person, place, date. Cooperative and sitting up on the  edge bed in no acute distress.     EYES: Pupils equal, round. Extraocular movements grossly intact.  Sclera nonicteric.  HEENT:  Normocephalic, Mucous membranes moist. No tonsillar exudate or erythema.   NECK: Supple.   CARDIOVASCULAR: Regular rate and rhythm without murmurs, rubs, or gallops.   PULMONARY: Scattered rhonchi bilaterally, frequent cough.  No wheezes or crackles.. No accessory muscle use or labored breathing.   GASTROINTESTINAL: Soft and non-distended. Normoactive bowel sounds. Nontender.   MUSCULOSKELETAL: Strength intact in all 4 extremities.  Limbs atraumatic.  No edema.  SKIN: Warm, dry, no rashes.  Tattoos.  NEUROLOGIC: Cranial nerves II-XII grossly intact.  Strength 5/5 in all 4 extremities.  Gait normal.   PSYCHIATRIC: Normal mood and affect.     Data   Data reviewed today:  I personally reviewed all lab results from the last 24 hours.    Recent Labs   Lab 06/06/19  1103   WBC 11.0   HGB 14.2   MCV 89         POTASSIUM 3.7   CHLORIDE 110*   CO2 26   BUN 21   CR 0.64   ANIONGAP 5   ADRY 8.6   *   ALBUMIN 3.6   PROTTOTAL 6.9   BILITOTAL 0.7   ALKPHOS 82   ALT 21   AST 13       Recent Results (from the past 24 hour(s))   XR Chest 2 Views    Narrative    XR CHEST 2 VW   6/6/2019 6:23 PM     HISTORY: cough    COMPARISON: None.    FINDINGS: The heart is negative.  The lungs are clear. The pulmonary  vasculature is normal.  The bones and soft tissues are unremarkable.      Impression    IMPRESSION: Negative, no active infiltrates.        ALICIA THOMAS MD
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
## 2019-06-07 NOTE — H&P
"Admitted:     06/06/2019      IDENTIFYING DATA AND REASON FOR REFERRAL:  Anmol Silverman is a 43-year-old woman who reports she is  and has 6 children between herself and her .  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  She presented to [facility] ED following a drug overdose in a self-injurious gesture.  She was brought in by [facility] after ingesting 24 mg tizanidine tablets.  Information was gathered through direct patient contact as well as chart review.      CHIEF COMPLAINT:  \"Yesterday, I took a handful of pills.  I lost my shit.\"      HISTORY OF PRESENT ILLNESS:  Anmol Silverman reports established history of anxiety and depression.  She reports that she has been working too much and feeling very overwhelmed.  She feels that she does not get enough support from her , even though she admits that he works doubly hard in the family owned business.  She states that her  works so much because the season for the business is very short.  She states he sometimes works 12-hour days.  She states he does all the label for the business and she takes care of the business end of the office.  She also reports a history of multiple sclerosis that is managed by Dr. Derek Holm at [facility] in [place].  She states she had been dealing with a cold for the last 3 weeks and it was getting the better of her.  She was not able to sleep.  She states she has been wanting to speak with her primary care physician regarding her symptoms, but she had been frustrated by his office because she was made to speak with the triage nurse first.  She states she was supposed to be going in to see her physician yesterday when one of the staff called in and she had to cancel plans for the doctor's office and go to work.  She states her  also got mad because she wanted to go to " work and not see the doctors and this apparently frustrated her.  She states she just felt overwhelmed and had contacted her mother by telephone to vent.  She  states she abruptly ended the call and decided that she was going to harm herself.  She states her mother must have been concerned about her and had sent her sister over to check on her.  She states she had already taken a handful of pills before her sister came over and she did inform her sister that she had taken a bunch of pills to harm herself.  She states when her sister tried to verify what she had taken, she showed her sister the bottle and proceeded to take more pills in her sister's presents on account of which the sister called 911.  With respect to depression symptom profile, the patient endorsed depressed mood, anhedonia, poor sleep, poor appetite as well as ruminative worry.  She states she worries about her prognosis, as she knows that MS is rather unpredictable.  She states she had a brother who completed suicide because he had lost function of his extremities from the disease.  She states she was angry for a long time because his suicide mess up his son and hurt several family members.  She states she did not understand where until he was until she developed symptoms of MS herself.  She states she has been thinking of suicide since then but does not want to end her life.  She does not endorse history consistent with lela or psychosis.  She endorses ruminative worry and occasional panic attacks.  She states she uses marijuana on a regular basis to alleviate her anxiety.  She does not endorse use of alcohol or illicit chemicals apart from the marijuana.      PAST PSYCHIATRIC HISTORY:  The patient reports she has only received antidepressant medications through her primary care physician has never seen a psychiatrist or therapist.  She states she did well on Cymbalta for a long time until it lost its effect.  She has also tried Celexa and  Lexapro.      CHEMICAL USE HISTORY:  As described in history of present illness, the patient reports that she smokes a lot of marijuana, but used to drink when she was younger.  She quit smoking cigarettes in January after 30 years of smoking.      PAST MEDICAL HISTORY:   1.  Multiple sclerosis.   2.  Gestational diabetes.   3.  Depression.   4.  Anxiety.   5.  Bartholin gland cyst.   6.  Vitamin D deficiency.      PAST SURGICAL HISTORY:   1.  L4-L5 fusion.   2.  Bartholin gland cyst drainage.   3.  Bilateral hip arthroscopy with labral repair.   4.  Right eye strabismus correction.   5.  Removal of benign left gluteal mass.      MEDICATIONS PRIOR TO ADMISSION:   1.  Neurontin 100 mg 2 p.o. at bedtime.   2.  Copaxone 40 mg subcutaneous 3 times weekly.    3.  Zoloft 100 mg p.o. daily.   4.  Vitamin D3 10,000 units p.o. daily.      ALLERGIES:  ADHESIVE TAPE.      FAMILY PSYCHIATRIC HISTORY:  Depression and completed suicide in her brother.  The patient believes her father also suffers from depression.      SOCIAL HISTORY:  The patient reports she grew up in [place].  She has 2 sisters and 2 half siblings.  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  She has 6 children between herself and her .  She denies criminal history.      REVIEW OF SYSTEMS:  A 10-point review of systems was negative apart from the pertinent positives in the history of present illness.      VITAL SIGNS:  Blood pressure 136/76, pulse 58, respirations 16, temperature 97.9, weight 75.2 kg.      MENTAL STATUS EXAMINATION:  This is a middle-aged woman who appears her stated age of 43.  She wears eyeglasses and is dressed in hospital scrubs and ambulates on her own without difficulty.  She makes good eye contact and speaks clearly and coherently.  Her mood is dysphoric  with a labile affect.  Her thought process is logical, relevant and goal directed.  She denies the presence of auditory or visual hallucinations.  There are no delusions elicited.  Her recall of recent and remote events is intact.  Her impulse control is fair.  Her gait and station are within normal limits.  Her muscle strength is adequate.  Her impulse control is marginal.  Her associations are tight.  Her language is appropriate.  She displays fair insight and judgment.  Risk assessment at this time is considered moderate on account of her recent ingestion.      DIAGNOSTIC IMPRESSION:  Anmol Leyva is a 43-year-old  mother with no prior psychiatric hospitalization who presents to the hospital on account of medication ingestion in a suicide attempt in the context of multiple psychosocial stressors.  She admits to neurovegetative symptoms of depression as well as from ruminative worry about her health.  Has history of completed suicide in a biological brother.      ADMITTING DIAGNOSES:   1.  Adjustment disorder with mixed anxiety and depressed mood.   2.  Major depressive disorder, single episode, severe without psychotic features.   3.  Multiple sclerosis.   4.  Vitamin D deficiency.   5.  Chronic hip pain.      PLAN:  The patient will be maintained on the step-down unit and staff will provide a safe environment for her.  She will be encouraged to participate in individual milieu and group therapy.  She will be offered Effexor XR at 75 mg daily in combination with her currently prescribed sertraline at 100 mg daily.  Staff will provide a safe environment for her and she will be encouraged to participate in the milieu.  Estimated length of stay 3-5 days.  She may be a candidate for IOP or individual psychotherapy following discharge from the hospital.         SHAN SEGOVIA MD             D: 06/07/2019   T: 06/07/2019   MT: KULWANT      Name:     ANMOL LEYVA   MRN:      [id number]        Account:       [id number]   :      1975        Admitted:     2019                   Document: [id number]       cc: MD Zhao

## 2019-06-07 NOTE — PROGRESS NOTES
06/07/19 1500   General Information   Date Initially Attended OT 06/07/19   Clinical Impression   Affect Appropriate to situation   Orientation Oriented to person, place and time   Appearance and ADLs General cleanliness observed in most areas   Attention to Internal Stimuli No observed signs   Interaction Skills Interacts appropriately with staff;Interacts appropriately with peers   Ability to Communicate Needs Independent   Verbal Content Appropriate to topic   Ability to Maintain Boundaries Maintains appropriate physical boundaries;Maintains appropriate verbal boundaries   Participation Initiates participation   Concentration Concentrates 5-10 minutes   Ability to Concentrate With structure   Follows and Comprehends Directions Independently follows multi-step directions   Memory Delayed and immediate recall intact   Organization Independently organizes medium tasks   Decision Making Independent   Planning and Problem Solving Needs further assessment   Ability to Apply and Learn Concepts Applies within group structure   Frustrations / Stress Tolerance Needs further assessment   Level of Insight Insightful into needs, issues, goals   Self Esteem Can identify positives   Social Supports Has knowledge of support systems   General Observation/Plan   General Observations/Plan See Comments     INITIAL O.T. ASSESSMENT:      Pt  attended 1 of 2 OT groups today. Pt transitioned I'lly to OT group and engaged in therapeutic activity addressing functional cognition and interpersonal skills with task set up. With task set up, pt participated in group meal planning task, which was utilized to promote and assess communication, problem-solving, time management and interpersonal effectiveness. Pt demo'd good initiation and collaboration with peers and actively participated throughout group session. Pt was attentive and engaged throughout. Pt will continue to benefit from OT intervention to address implementation of positive  functional coping skills, role performance, and community reintegration.      OT assessment completed by semi-structured interview and direct observation. Cited SI and overdose as the reason for current hospitalization. Goals ID'd include to improve self-care and balance, to be more assertive, to ask for help and support when needed, and to better ID and express feelings. OT staff explained the purpose of pt being involved in current treatment plan.      Plan: Encourage ongoing attendance as able to meet self-stated goals, implement positive coping skills, engage in therapeutic activities, and provide assessment ongoing.

## 2019-06-07 NOTE — PLAN OF CARE
Pt presents with sad affect and anxious mood. Was seen crying in her room multiple times throughout the shift due to the stress of another ITC patient's actions. She had many questions regarding the process of her stay and treatment plan, as this is her first visit and is voluntary stating she just needs a break. Once she was reassured she was in good hands her demeanor changed and was eventually moved to SDU. Med compliant, no Si.

## 2019-06-08 PROCEDURE — 25000132 ZZH RX MED GY IP 250 OP 250 PS 637: Performed by: PSYCHIATRY & NEUROLOGY

## 2019-06-08 PROCEDURE — 25000132 ZZH RX MED GY IP 250 OP 250 PS 637: Performed by: PHYSICIAN ASSISTANT

## 2019-06-08 PROCEDURE — 90853 GROUP PSYCHOTHERAPY: CPT

## 2019-06-08 PROCEDURE — 12400000 ZZH R&B MH

## 2019-06-08 RX ADMIN — GUAIFENESIN AND DEXTROMETHORPHAN 5 ML: 100; 10 SYRUP ORAL at 16:36

## 2019-06-08 RX ADMIN — CHOLECALCIFEROL CAP 125 MCG (5000 UNIT) 10000 UNITS: 125 CAP at 08:15

## 2019-06-08 RX ADMIN — GUAIFENESIN AND DEXTROMETHORPHAN 5 ML: 100; 10 SYRUP ORAL at 22:26

## 2019-06-08 RX ADMIN — CETIRIZINE HYDROCHLORIDE 5 MG: 5 TABLET ORAL at 08:15

## 2019-06-08 RX ADMIN — GABAPENTIN 200 MG: 100 CAPSULE ORAL at 20:25

## 2019-06-08 RX ADMIN — SERTRALINE HYDROCHLORIDE 100 MG: 100 TABLET ORAL at 08:15

## 2019-06-08 RX ADMIN — GUAIFENESIN AND DEXTROMETHORPHAN 5 ML: 100; 10 SYRUP ORAL at 02:58

## 2019-06-08 RX ADMIN — VENLAFAXINE HYDROCHLORIDE 75 MG: 75 CAPSULE, EXTENDED RELEASE ORAL at 08:15

## 2019-06-08 RX ADMIN — AZITHROMYCIN 250 MG: 250 TABLET, FILM COATED ORAL at 11:41

## 2019-06-08 RX ADMIN — DIPHENHYDRAMINE HYDROCHLORIDE 25 MG: 25 CAPSULE ORAL at 02:59

## 2019-06-08 ASSESSMENT — ACTIVITIES OF DAILY LIVING (ADL)
ORAL_HYGIENE: INDEPENDENT
DRESS: INDEPENDENT;STREET CLOTHES
LAUNDRY: WITH SUPERVISION
HYGIENE/GROOMING: INDEPENDENT;SHOWER

## 2019-06-08 NOTE — PLAN OF CARE
" Pt appears with bright, full range affect and calm mood, polite to staff and peers but feels so bored as there isn't much to do. Would like SW to assist her resources so she can plan her self for OP Therapy. 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  cares for the three years old. Pt took showered and denies any SI. \" I am just stressed out from being overwhelm with managing work and family. Pt is med compliant and would like to discharge Monday.        "

## 2019-06-08 NOTE — PLAN OF CARE
Pt appears to be bright and social when she is around peers, but sad and tearful when alone in her room. She has been attending groups. Respectful and polite with staff and peers. She states that the cold meds are working, and she is feeling better. 2 of her daughters visited, which brightened her mood. Pt talked about her stressors with writer, and how she would like to go on a vacation to [place] with her two youngest children after discharge.

## 2019-06-08 NOTE — PROGRESS NOTES
[facility] Psychiatric Progress Note      Interval History:   Pt seen on the stepdown unit in the presence of her mother with her permission.  Staff report that she has been irritable today, presumably because of some telephone calls she had received that did not seem to have gone well as she slammed her door afterward.  On direct interview, patient reports that she needs to discharge from the hospital on Monday with a definite appointments for therapy and psychiatric care because she has a lot on her plates that she needs to take care of.  She admits that she worries excessively about things that are out of her control and lacks the ability to delegate duties.  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  She reiterates the fact that she hates her multiple sclerosis and its effect on her.  Motivational interviewing was utilized in identifying her strengths and minimizing her weaknesses.  The potential benefits of participating in an intensive outpatient program were discussed with her but she was not sure she would do well and group therapy.  She denies entertaining any further thoughts of self-harm and reports future orientation.  She denies any untoward effects from her currently prescribed medications.     Review of systems:   The Review of Systems is negative other than noted in the HPI     Medications:       sodium chloride 0.9%  1,000 mL Intravenous Once     azithromycin  250 mg Oral Daily     cetirizine  5 mg Oral Daily     cholecalciferol  10,000 Units Oral Daily     gabapentin  200 mg Oral At Bedtime     glatiramer  40 mg Subcutaneous Once per day on Mon Wed Fri     sertraline  100 mg Oral Daily     venlafaxine  75 mg Oral Daily with breakfast     benzonatate, diphenhydrAMINE, guaiFENesin-dextromethorphan, hydrOXYzine, ibuprofen    Mental Status Examination:     Appearance:  awake, alert, adequately groomed and casually dressed  Eye  Contact:  better  Speech:  clear, coherent  Psychomotor Behavior:  no evidence of tardive dyskinesia, dystonia, or tics and fidgeting  Mood: Dysphoric  Affect: Mildly irritable with tense affect  Thought Process:  logical, linear and goal oriented no loose associations  Thought Content:  no evidence of suicidal ideation or homicidal ideation.  Denies auditory or visual hallucinations.  Oriented to:  time, person, and place  Attention Span and Concentration:  limited  Recent and Remote Memory:  limited  Fund of Knowledge: appropriate  Muscle Strength and Tone: normal  Gait and Station: Normal  Insight:  fair  Judgment:  limited          Labs/Vitals:   No results found for this or any previous visit (from the past 24 hour(s)).  B/P: 149/84, T: 98, P: 63, R: 16    Impression:   Anmol Silverman is a 43-year-old  mother with no prior psychiatric hospitalization who presents to the hospital on account of medication ingestion in a suicide attempt in the context of multiple psychosocial stressors.  She admits to neurovegetative symptoms of depression as well as from ruminative worry about her health.  Has history of completed suicide in a biological brother.       DIagnoses:     1.  Adjustment disorder with mixed anxiety and depressed mood.   2.  Major depressive disorder, single episode, severe without psychotic features.   3.  Multiple sclerosis.   4.  Vitamin D deficiency.   5.  Chronic hip pain.          Plan:   1. Written information given on medications. Side effects, risks, benefits reviewed.  2.  Maintain current medications without changes and continue hospitalization.      Attestation:  Patient has been seen and evaluated by Leonor rehman MD    PATIENT ID  Name: Anmol Silverman  MRN:[id number]  YOB: 1975

## 2019-06-09 PROCEDURE — 12400000 ZZH R&B MH

## 2019-06-09 PROCEDURE — 25000132 ZZH RX MED GY IP 250 OP 250 PS 637: Performed by: PHYSICIAN ASSISTANT

## 2019-06-09 PROCEDURE — 25000132 ZZH RX MED GY IP 250 OP 250 PS 637: Performed by: PSYCHIATRY & NEUROLOGY

## 2019-06-09 RX ADMIN — BENZONATATE 100 MG: 100 CAPSULE ORAL at 09:06

## 2019-06-09 RX ADMIN — GUAIFENESIN AND DEXTROMETHORPHAN 5 ML: 100; 10 SYRUP ORAL at 03:39

## 2019-06-09 RX ADMIN — SERTRALINE HYDROCHLORIDE 100 MG: 100 TABLET ORAL at 09:03

## 2019-06-09 RX ADMIN — AZITHROMYCIN 250 MG: 250 TABLET, FILM COATED ORAL at 12:00

## 2019-06-09 RX ADMIN — GABAPENTIN 200 MG: 100 CAPSULE ORAL at 21:07

## 2019-06-09 RX ADMIN — VENLAFAXINE HYDROCHLORIDE 75 MG: 75 CAPSULE, EXTENDED RELEASE ORAL at 09:03

## 2019-06-09 RX ADMIN — CETIRIZINE HYDROCHLORIDE 5 MG: 5 TABLET ORAL at 09:03

## 2019-06-09 RX ADMIN — GUAIFENESIN AND DEXTROMETHORPHAN 5 ML: 100; 10 SYRUP ORAL at 22:55

## 2019-06-09 RX ADMIN — CHOLECALCIFEROL CAP 125 MCG (5000 UNIT) 10000 UNITS: 125 CAP at 09:03

## 2019-06-09 RX ADMIN — DIPHENHYDRAMINE HYDROCHLORIDE 25 MG: 25 CAPSULE ORAL at 03:38

## 2019-06-09 RX ADMIN — GUAIFENESIN AND DEXTROMETHORPHAN 10 ML: 100; 10 SYRUP ORAL at 11:11

## 2019-06-09 ASSESSMENT — ACTIVITIES OF DAILY LIVING (ADL)
LAUNDRY: WITH SUPERVISION
HYGIENE/GROOMING: INDEPENDENT
ORAL_HYGIENE: INDEPENDENT
ORAL_HYGIENE: INDEPENDENT
HYGIENE/GROOMING: INDEPENDENT
DRESS: INDEPENDENT
DRESS: INDEPENDENT

## 2019-06-09 NOTE — PLAN OF CARE
Patient presents with a full range affect and calm mood. Pt's mom and daughters visited, but after they left, she was crying in her room. Pt feels guilty for being here. Working on safety plan. Pt states she has a hard time sitting still, and feels like she needs to be moving or cleaning. Pt was able to sit and color on her own for at least 30 minutes. Attending groups and participating.

## 2019-06-09 NOTE — PLAN OF CARE
Pt is visible with full range affect, calm mood, pleasant and respectful to others. Pt attended groups and participated well. Pt observed coloring and socializing with peers. Pt took shower and was med compliant. Pt wants to be discharge tomorrow with set up therapy appoints OP so she can get back with her life.

## 2019-06-10 VITALS
HEART RATE: 62 BPM | TEMPERATURE: 98.4 F | SYSTOLIC BLOOD PRESSURE: 132 MMHG | HEIGHT: 64 IN | OXYGEN SATURATION: 97 % | WEIGHT: 165.8 LBS | BODY MASS INDEX: 28.31 KG/M2 | DIASTOLIC BLOOD PRESSURE: 90 MMHG | RESPIRATION RATE: 16 BRPM

## 2019-06-10 PROCEDURE — 25000132 ZZH RX MED GY IP 250 OP 250 PS 637: Performed by: PSYCHIATRY & NEUROLOGY

## 2019-06-10 PROCEDURE — 25000132 ZZH RX MED GY IP 250 OP 250 PS 637: Performed by: PHYSICIAN ASSISTANT

## 2019-06-10 PROCEDURE — 90853 GROUP PSYCHOTHERAPY: CPT

## 2019-06-10 RX ORDER — AZITHROMYCIN 250 MG/1
250 TABLET, FILM COATED ORAL DAILY
Qty: 2 TABLET | Refills: 0 | Status: SHIPPED | OUTPATIENT
Start: 2019-06-10 | End: 2019-06-12

## 2019-06-10 RX ORDER — HYDROXYZINE HYDROCHLORIDE 25 MG/1
25-50 TABLET, FILM COATED ORAL EVERY 6 HOURS PRN
Qty: 60 TABLET | Refills: 0 | Status: SHIPPED | OUTPATIENT
Start: 2019-06-10 | End: 2019-07-10

## 2019-06-10 RX ORDER — VENLAFAXINE HYDROCHLORIDE 75 MG/1
75 CAPSULE, EXTENDED RELEASE ORAL
Qty: 30 CAPSULE | Refills: 0 | Status: SHIPPED | OUTPATIENT
Start: 2019-06-11 | End: 2019-07-11

## 2019-06-10 RX ORDER — CETIRIZINE HYDROCHLORIDE 5 MG/1
5 TABLET ORAL DAILY
Qty: 30 TABLET | Refills: 0 | Status: SHIPPED | OUTPATIENT
Start: 2019-06-11 | End: 2019-07-11

## 2019-06-10 RX ADMIN — CETIRIZINE HYDROCHLORIDE 5 MG: 5 TABLET ORAL at 08:41

## 2019-06-10 RX ADMIN — GUAIFENESIN AND DEXTROMETHORPHAN 5 ML: 100; 10 SYRUP ORAL at 11:36

## 2019-06-10 RX ADMIN — AZITHROMYCIN 250 MG: 250 TABLET, FILM COATED ORAL at 11:35

## 2019-06-10 RX ADMIN — SERTRALINE HYDROCHLORIDE 100 MG: 100 TABLET ORAL at 08:41

## 2019-06-10 RX ADMIN — CHOLECALCIFEROL CAP 125 MCG (5000 UNIT) 10000 UNITS: 125 CAP at 08:41

## 2019-06-10 RX ADMIN — VENLAFAXINE HYDROCHLORIDE 75 MG: 75 CAPSULE, EXTENDED RELEASE ORAL at 08:41

## 2019-06-10 ASSESSMENT — ACTIVITIES OF DAILY LIVING (ADL)
ORAL_HYGIENE: INDEPENDENT
HYGIENE/GROOMING: INDEPENDENT
LAUNDRY: WITH SUPERVISION
DRESS: INDEPENDENT

## 2019-06-10 NOTE — DISCHARGE INSTRUCTIONS
Behavioral Discharge Planning and Instructions    Summary: Admitted to hospital after overdose by ingestion.    Main Diagnosis: Adjustment disorder with mixed anxiety and depressed mood; Major depressive disorder; Multiple sclerosis.     Major Treatments, Procedures and Findings: Psychiatric assessment; Medication adjustment.    Symptoms to Report: mood getting worse or thoughts of suicide    Lifestyle Adjustment: Develop and follow safety plan. Follow up with therapy and medication management. Work with therapist on balancing own care with care for others. Remember: put on your own oxygen mask before assisting others.    Psychiatry Follow-up:     Therapy intake on Monday 6/17/19 @ 1:30 pm with Karyna Hinds. Come 15 minutes early for paperwork.  Medication management intake with Mariposa Hinds on Monday 7/8/19 @ 2pm  [facility] [address]  [place], [place] [address] [phone] / Fax: [phone]    Resources:     [facility]634 [phone]  Crisis Intervention: 483.715.6448 or 452-927-1987 (TTY: 626.262.8124).  Call anytime for help.  [place] [employer] [place]ness (www.mn.candice.org): 349.651.9497 or 835-613-7555.  National Suicide Prevention Line (www.mentalhealthmn.org): 297-891-NYWE (2615)    General Medication Instructions:   See your medication sheet(s) for instructions.   Take all medicines as directed.  Make no changes unless your doctor suggests them.   Go to all your doctor visits.  Be sure to have all your required lab tests. This way, your medicines can be refilled on time.  Do not use any drugs not prescribed by your doctor.  Avoid alcohol.

## 2019-06-10 NOTE — PROGRESS NOTES
Patient denies suicidal ideation.  Reviewed discharge instructions with patient. Patient verbalizes understanding of discharge instructions and has copies of them.  Discharged at 1305 to home.

## 2019-06-10 NOTE — PROGRESS NOTES
Met with patient to discuss discharge plans. Went over her safety plan, which she had completed and had put thought into. Discussed her situation and issues with . Patient agreed to referral to [facility] and therapy and medication management intakes were set up following the meeting. Patient is feeling more positive and is looking forward to discharge.

## 2019-06-10 NOTE — PLAN OF CARE
Pt presents with a full range affect and calm mood. Pt had an emotional phone call with her . He visited shortly after, and brought their 3-year old daughter, which brightened pt's mood. Patient volunteered to shave a male peer's beard, and then shaved two more men after. She hopes to discharge Monday.

## 2019-06-23 NOTE — DISCHARGE SUMMARY
[facility]    Discharge Summary  Adult Psychiatry    Date of Admission:  6/6/2019  Date of Discharge:  6/10/2019  2:22 PM  Discharging Provider: Leonor Parra MD  Date of Service (when I saw the patient): 06/10/19    Discharge Diagnoses   1.  Adjustment disorder with mixed anxiety and depressed mood.   2.  Major depressive disorder, single episode, severe without psychotic features.   3.  Multiple sclerosis.   4.  Vitamin D deficiency.   5.  Chronic hip pain.    History of Present Illness   Anmol Silverman is a 43-year-old woman who reports she is  and has 6 children between herself and her .  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  She presented to [facility] ED following a drug overdose in a self-injurious gesture.  She was brought in by [facility] after ingesting 24 mg tizanidine tablets. For more details, I refer the reader to the initial psychiatric assessment documented on 6/7/2019 by Leonor Parra MD in addition to the history and physical examination documented by Marquise Min PA-C on 6/7/2019.    Hospital Course   Anmol Silverman was admitted on 6/6/2019.  Following admission to the mental health unit, the patient was introduced to the milieu and encouraged to ventilate her stressors.  Staff introduced her to the unit and she was encouraged to participate in individual, milieu and group therapy.  He described multiple psychosocial stressors both at home and at work that were compounded by her medical diagnosis of multiple sclerosis.  She felt she was not being heard by her  on account of which she got frustrated and impulsively ingested multiple pills in a suicide gesture.  She admitted that she had been contemplating suicide at other times on account of feeling hopeless about the future.  She discussed the impact of  multiple sclerosis on her brother that ultimately led to him completing suicide.  She claims she could not understand what he did at the time but claims she now understands as she describes multiple sclerosis as a terrible illness.  Given the severity of her symptoms, she was encouraged to consider optimizing her anti-depressant medications by adding Effexor XR at 75 mg daily while maintaining sertraline at 150 mg daily.  The benefits of individual psychotherapy and outpatient medication management were also discussed with her and she verbalized understanding and gave consent for outpatient referrals.  She soon began to report improvement in her mood and function.  She participated diligently in individual milieu and group therapy.  By 6/10/2019, the patient felt medically and psychologically stable enough to discharge from the hospital.  She did not endorse any self-harm thoughts plans or intent and expressed future orientation.  She tolerated her medications without any untoward effects and she accepted aftercare recommendations.    Leonor Parra MD    Significant Results and Procedures   Please see below.    Unresulted Labs Ordered in the Past 30 Days of this Admission     No orders found from 4/7/2019 to 6/7/2019.          Code Status   Full Code    Primary Care Physician   Karyna Bowers    Physical Exam   Appearance:  awake, alert, adequately groomed and casually dressed  Attitude:  cooperative  Eye Contact:  good  Mood:  good  Affect:  appropriate and in normal range and mood congruent  Speech:  clear, coherent and normal prosody  Psychomotor Behavior:  no evidence of tardive dyskinesia, dystonia, or tics and intact station, gait and muscle tone  Thought Process:  logical, linear and goal oriented  Associations:  no loose associations  Thought Content:  no evidence of suicidal ideation or homicidal ideation and no evidence of psychotic thought  Insight:  good  Judgment:  intact  Oriented to:   time, person, and place  Attention Span and Concentration:  intact  Recent and Remote Memory:  intact  Language: Able to name objects and Able to repeat phrases  Fund of Knowledge: appropriate  Muscle Strength and Tone: normal  Gait and Station: Normal    Time Spent on this Encounter   DAMIÁN, Leonor Parra, personally saw the patient today and spent greater than 30 minutes discharging this patient.    Discharge Disposition   Discharged to home  Condition at discharge: Stable  Psychiatry Follow-up:     Therapy intake on Monday 6/17/19 @ 1:30 pm with Karyna Hinds. Come 15 minutes early for paperwork.  Medication management intake with Mariposa Hinds on Monday 7/8/19 @ 2pm  [facility] [address]  [place] [address]  [phone] / Fax: [phone]    Consultations This Hospital Stay   HOSPITALIST IP CONSULT    Discharge Orders   No discharge procedures on file.  Discharge Medications   Discharge Medication List as of 6/10/2019 12:45 PM      START taking these medications    Details   azithromycin (ZITHROMAX) 250 MG tablet Take 1 tablet (250 mg) by mouth daily for 2 days, Disp-2 tablet, R-0, E-Prescribe      cetirizine (ZYRTEC) 5 MG tablet Take 1 tablet (5 mg) by mouth daily, Disp-30 tablet, R-0, E-Prescribe      hydrOXYzine (ATARAX) 25 MG tablet Take 1-2 tablets (25-50 mg) by mouth every 6 hours as needed for anxiety, Disp-60 tablet, R-0, E-Prescribe      venlafaxine (EFFEXOR-XR) 75 MG 24 hr capsule Take 1 capsule (75 mg) by mouth daily (with breakfast), Disp-30 capsule, R-0, E-Prescribe         CONTINUE these medications which have NOT CHANGED    Details   gabapentin (NEURONTIN) 100 MG capsule Take 200 mg by mouth At Bedtime, Historical      glatiramer (COPAXONE) 20 MG/ML injection Inject 40 mg Subcutaneous three times a week Monday , Wednesday, Friday, Historical      sertraline (ZOLOFT) 100 MG tablet Take 100 mg by mouth At Bedtime, Historical      vitamin  D3 (CHOLECALCIFEROL) 89006 units (250 mcg) capsule Take 1 capsule by mouth daily, Historical           Allergies   Allergies   Allergen Reactions     Adhesive Tape Rash     from some bandages     Data   Most Recent 3 CBC's:  Recent Labs   Lab Test 06/06/19  1103 04/25/16  0915 09/28/15  1358   WBC 11.0 16.5* 10.2   HGB 14.2 12.3 13.7   MCV 89 87 88    210 259      Most Recent 3 BMP's:  Recent Labs   Lab Test 06/06/19  1103 09/28/15  1358    137   POTASSIUM 3.7 3.6   CHLORIDE 110* 105   CO2 26 25   BUN 21 10   CR 0.64 0.58   ANIONGAP 5 7   ADRY 8.6 8.3*   * 79     Most Recent 2 LFT's:  Recent Labs   Lab Test 06/06/19  1103 09/28/15  1358   AST 13 8   ALT 21 16   ALKPHOS 82 64   BILITOTAL 0.7 0.5      Panel:No lab results found.  Most Recent 6 Bacteria Isolates From Any Culture (See EPIC Reports for Culture Details):  Recent Labs   Lab Test 09/16/15   CULT mixed katie     Most Recent TSH, T4 and A1c Labs:  Recent Labs   Lab Test 06/06/19  1103   TSH 1.04     Results for orders placed or performed during the hospital encounter of 06/06/19   XR Chest 2 Views    Narrative    XR CHEST 2 VW   6/6/2019 6:23 PM     HISTORY: cough    COMPARISON: None.    FINDINGS: The heart is negative.  The lungs are clear. The pulmonary  vasculature is normal.  The bones and soft tissues are unremarkable.      Impression    IMPRESSION: Negative, no active infiltrates.        ALICIA THOMAS MD

## 2020-12-22 ENCOUNTER — TRANSFERRED RECORDS (OUTPATIENT)
Dept: HEALTH INFORMATION MANAGEMENT | Facility: CLINIC | Age: 45
End: 2020-12-22

## 2021-01-06 ENCOUNTER — TRANSFERRED RECORDS (OUTPATIENT)
Dept: HEALTH INFORMATION MANAGEMENT | Facility: CLINIC | Age: 46
End: 2021-01-06

## 2021-03-28 ENCOUNTER — HOSPITAL ENCOUNTER (EMERGENCY)
Facility: CLINIC | Age: 46
Discharge: HOME | End: 2021-03-28
Attending: EMERGENCY MEDICINE | Admitting: EMERGENCY MEDICINE
Payer: COMMERCIAL

## 2021-03-28 VITALS
TEMPERATURE: 98 F | DIASTOLIC BLOOD PRESSURE: 90 MMHG | OXYGEN SATURATION: 98 % | RESPIRATION RATE: 18 BRPM | SYSTOLIC BLOOD PRESSURE: 133 MMHG | HEART RATE: 65 BPM

## 2021-03-28 DIAGNOSIS — Z23 NEED FOR TDAP VACCINATION: ICD-10-CM

## 2021-03-28 DIAGNOSIS — S01.111A RIGHT EYELID LACERATION, INITIAL ENCOUNTER: ICD-10-CM

## 2021-03-28 PROCEDURE — 272N000047 HC ADHESIVE DERMABOND SKIN

## 2021-03-28 PROCEDURE — 90471 IMMUNIZATION ADMIN: CPT | Performed by: EMERGENCY MEDICINE

## 2021-03-28 PROCEDURE — 99283 EMERGENCY DEPT VISIT LOW MDM: CPT | Mod: 25

## 2021-03-28 PROCEDURE — 250N000011 HC RX IP 250 OP 636: Performed by: EMERGENCY MEDICINE

## 2021-03-28 PROCEDURE — 12001 RPR S/N/AX/GEN/TRNK 2.5CM/<: CPT

## 2021-03-28 PROCEDURE — 90715 TDAP VACCINE 7 YRS/> IM: CPT | Performed by: EMERGENCY MEDICINE

## 2021-03-28 RX ORDER — LIDOCAINE HYDROCHLORIDE AND EPINEPHRINE 10; 10 MG/ML; UG/ML
INJECTION, SOLUTION INFILTRATION; PERINEURAL
Status: DISCONTINUED
Start: 2021-03-28 | End: 2021-03-28 | Stop reason: HOSPADM

## 2021-03-28 RX ADMIN — CLOSTRIDIUM TETANI TOXOID ANTIGEN (FORMALDEHYDE INACTIVATED), CORYNEBACTERIUM DIPHTHERIAE TOXOID ANTIGEN (FORMALDEHYDE INACTIVATED), BORDETELLA PERTUSSIS TOXOID ANTIGEN (GLUTARALDEHYDE INACTIVATED), BORDETELLA PERTUSSIS FILAMENTOUS HEMAGGLUTININ ANTIGEN (FORMALDEHYDE INACTIVATED), BORDETELLA PERTUSSIS PERTACTIN ANTIGEN, AND BORDETELLA PERTUSSIS FIMBRIAE 2/3 ANTIGEN 0.5 ML: 5; 2; 2.5; 5; 3; 5 INJECTION, SUSPENSION INTRAMUSCULAR at 08:22

## 2021-03-28 ASSESSMENT — ENCOUNTER SYMPTOMS: WOUND: 1

## 2021-03-28 NOTE — ED PROVIDER NOTES
History   Chief Complaint:  Facial Laceration       The history is provided by the patient.      Anmol Silverman is a 45 year old female with history of Multiple sclerosis who presents with facial laceration to right eyelid. She was sleeping on the edge of the bed, and in a panic to turn off her alarm clock she hit her right eye on the side table. She states her stomach is upset, but she is nervous. Denies loss of consciousness and new visual disturbances. She wears glasses.    Per [facility], Td/Tdap was administered on 07/14/2010.     Review of Systems   Eyes: Negative for visual disturbance.   Skin: Positive for wound.   Neurological: Negative for syncope.   All other systems reviewed and are negative.      Allergies:  Adhesive Tape    Medications:  gabapentin   glatiramer   sertraline   venlafaxine     Past Medical History:    Anxiety   Depression    Bartholin gland cyst   Gestational diabetes mellitus with oral hypoglycemia therapy   Multiple sclerosis   Hemorrhoids   Vitamin D deficiency   Prior pregnancy complicated by PIH, antepartum, third trimester   Suicidal ideation      Past Surgical History:    Tumor removal  Back surgery   Bartholin gland   Eye surgery   Bilateral Labral repair    Family History:    Father: depression , diabetes mellitus   Brother:  Depression    Mother: hypertension     Social History:  Presents alone.   PCP:  Karyna Bowers    Physical Exam     Patient Vitals for the past 24 hrs:   BP Temp Temp src Pulse Resp SpO2   03/28/21 0850 (!) 133/90 -- -- 65 -- 98 %   03/28/21 0757 (!) 142/76 98  F (36.7  C) Oral 72 18 100 %       Physical Exam  Eyes:         General: the patient is awake and interactive  HEENT:  Pupils 3 mm and reactive bilaterally. Moist mucous membranes, conjunctiva normal; 1 cm superficial laceration to right upper eyelid. EOMI.  No racoon eyes or orbital tenderness.  Pulmonary:  Normal respiratory effort  Cardiovascular:  Well perfused  Musculoskeletal:  Moving 4  extremities grossly wnl, no deformities  Neuro:  Speech normal, no focal deficits. GCS 15.  Psych:  Normal affect    Emergency Department Course       Procedures      Laceration Repair        LACERATION:  A simple and superficial clean 1 cm laceration.      LOCATION:  Right upper eyelid      FUNCTION:  Distally sensation, circulation and motor are intact.      ANESTHESIA:  None      PREPARATION:  Irrigation with Normal Saline      DEBRIDEMENT:  no debridement      CLOSURE:  Wound was closed with Dermabond        Emergency Department Course:    Reviewed:  I reviewed nursing notes, vitals, past medical history and care everywhere    Assessments:  0810 I obtained history and examined the patient as noted above.     Interventions:  0822 Tdap 0.5mL intramuscular      Disposition:  The patient was discharged to home.     Impression & Plan       Medical Decision Making:  Patient presents with right eyelid laceration.  Please see above for details of HPI. Findings and exam are consistent with an uncomplicated laceration which was repaired as noted above. There is no evidence at this time to suggest any associated fracture or foreign body.  There is no evidence to suggest intracranial injury and patient is neurologically intact. Indications to seek urgent reevaluation and signs of infection (including but not limited to increasing pain, redness, swelling, fevers, and drainage) were reviewed. Tetanus booster was administered. This is a clean and noncontaminated wound in which prophylactic antibiotics are not indicated. An understanding of the discharge instructions and need for follow up were verbally confirmed.         Diagnosis:    ICD-10-CM    1. Right eyelid laceration, initial encounter  S01.111A    2. Need for Tdap vaccination  Z23        Scribe Disclosure:  Anil STEEL, am serving as a scribe at 8:03 AM on 3/28/2021 to document services personally performed by Felix Nagel MD based on my observations and the  provider's statements to me.        Felix Nagel MD  03/28/21 7923

## 2021-03-28 NOTE — ED TRIAGE NOTES
Rolled out of bed this am and struck the corner of end table.  Laceration to right eyelid.  Occurred about 7am.  Bleeding controlled.  No LOC.  Tetanus unknown.

## 2021-05-19 ENCOUNTER — TRANSCRIBE ORDERS (OUTPATIENT)
Dept: OTHER | Age: 46
End: 2021-05-19

## 2021-05-19 ENCOUNTER — MEDICAL CORRESPONDENCE (OUTPATIENT)
Dept: HEALTH INFORMATION MANAGEMENT | Facility: CLINIC | Age: 46
End: 2021-05-19

## 2021-05-19 DIAGNOSIS — H54.7 VISION LOSS: Primary | ICD-10-CM

## 2021-05-24 ENCOUNTER — TELEPHONE (OUTPATIENT)
Dept: OPHTHALMOLOGY | Facility: CLINIC | Age: 46
End: 2021-05-24

## 2021-05-24 NOTE — TELEPHONE ENCOUNTER
[facility] Call Center    Phone Message    May a detailed message be left on voicemail: yes     Reason for Call: Appointment Intake    Referring Provider Name: Referred by Dr. Derek Holm at [facility]   Phone: [phone] Fax: [phone]  Diagnosis and/or Symptoms: Neuro-Ophth for vision loss.    Please review     Action Taken: Message routed to:  [facility] eye     Travel Screening: Not Applicable

## 2021-05-25 NOTE — CONFIDENTIAL NOTE
Just one eye she thinks.  From 6 o'clock to 12 o'clock . Squiggly lines  Build up over 30 minutes.  No associated headache.  Some eye pain.  Five episodes total with last episode 2 days ago.  Patient's doctor wanted her to see neuro-ophthalmology.  Assisted with scheduling.      Thea Michel on 5/25/2021 at 4:48 PM

## 2021-07-26 DIAGNOSIS — H53.40 VISUAL FIELD DEFECT: Primary | ICD-10-CM

## 2022-06-05 ENCOUNTER — APPOINTMENT (OUTPATIENT)
Dept: GENERAL RADIOLOGY | Facility: CLINIC | Age: 47
End: 2022-06-05
Attending: EMERGENCY MEDICINE
Payer: COMMERCIAL

## 2022-06-05 ENCOUNTER — HOSPITAL ENCOUNTER (EMERGENCY)
Facility: CLINIC | Age: 47
Discharge: HOME | End: 2022-06-05
Attending: EMERGENCY MEDICINE | Admitting: EMERGENCY MEDICINE
Payer: COMMERCIAL

## 2022-06-05 VITALS
OXYGEN SATURATION: 97 % | SYSTOLIC BLOOD PRESSURE: 119 MMHG | HEART RATE: 53 BPM | RESPIRATION RATE: 18 BRPM | TEMPERATURE: 97.3 F | DIASTOLIC BLOOD PRESSURE: 68 MMHG

## 2022-06-05 DIAGNOSIS — M54.41 ACUTE RIGHT-SIDED LOW BACK PAIN WITH RIGHT-SIDED SCIATICA: ICD-10-CM

## 2022-06-05 PROCEDURE — 250N000013 HC RX MED GY IP 250 OP 250 PS 637: Performed by: EMERGENCY MEDICINE

## 2022-06-05 PROCEDURE — 72100 X-RAY EXAM L-S SPINE 2/3 VWS: CPT

## 2022-06-05 PROCEDURE — 99284 EMERGENCY DEPT VISIT MOD MDM: CPT

## 2022-06-05 RX ORDER — PREDNISONE 10 MG/1
TABLET ORAL
Qty: 20 TABLET | Refills: 0 | Status: SHIPPED | OUTPATIENT
Start: 2022-06-05

## 2022-06-05 RX ORDER — OXYCODONE HYDROCHLORIDE 5 MG/1
5-10 TABLET ORAL EVERY 6 HOURS PRN
Qty: 8 TABLET | Refills: 0 | Status: SHIPPED | OUTPATIENT
Start: 2022-06-05 | End: 2022-06-08

## 2022-06-05 RX ORDER — OXYCODONE HYDROCHLORIDE 5 MG/1
10 TABLET ORAL ONCE
Status: COMPLETED | OUTPATIENT
Start: 2022-06-05 | End: 2022-06-05

## 2022-06-05 RX ADMIN — OXYCODONE HYDROCHLORIDE 10 MG: 5 TABLET ORAL at 14:09

## 2022-06-05 ASSESSMENT — ENCOUNTER SYMPTOMS
DIFFICULTY URINATING: 0
DIARRHEA: 0
UNEXPECTED WEIGHT CHANGE: 0
VOMITING: 0
CHILLS: 0
WEAKNESS: 1
ABDOMINAL PAIN: 0
NUMBNESS: 1
FEVER: 0
DYSURIA: 0
BACK PAIN: 1
CONSTIPATION: 0
HEMATURIA: 0

## 2022-06-05 NOTE — ED TRIAGE NOTES
"Pt arrives with c/o back pain. 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 Pt reports pain to right low back with radiation into right leg and tingling sensation to right foot. Pt denies any loss of bowel or bladder control. Pt took advil duo around 8-9 am.      Triage Assessment     Row Name 06/05/22 1233       Triage Assessment (Adult)    Airway WDL WDL       Respiratory WDL    Respiratory WDL WDL       Skin Circulation/Temperature WDL    Skin Circulation/Temperature WDL WDL       Cardiac WDL    Cardiac WDL WDL       Peripheral/Neurovascular WDL    Peripheral Neurovascular WDL WDL       Cognitive/Neuro/Behavioral WDL    Cognitive/Neuro/Behavioral WDL WDL              "

## 2022-06-05 NOTE — ED PROVIDER NOTES
History   Chief Complaint:  Back Pain    The history is provided by the patient.      Anmol Silverman is a 46 year old female with history of multiple sclerosis and L4-5 fusion who presents with back pain. 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 She states that her pain radiates to her right hip and right leg. She notes right-sided leg weakness. The patient also endorses numbness to her right lateral foot. Groin pain is additionally reported. She has reportedly been consistently taking Advil Dual at home, with mild relief. Of note, the patient endorses a personal history of MS, bilateral hip issues, and a past lumbar fusion, which occurred about 17 years ago. She reports no history of cancer or IV drug use. There have reportedly been no recent injuries. At this time, the patient denies any tingling or burning sensation. She reports no numbness to her groin or rectum. There are reportedly no urinary or bowel symptoms. The patient notes no fever, chills, vomiting, or diarrhea. She endorses no chest pain or diaphoresis. She denies abdominal pain, dysuria, or hematuria. She additionally denies any incontinence or unexpected weight loss.     Review of Systems   Constitutional: Negative for chills, fever and unexpected weight change.   Cardiovascular: Negative for chest pain.   Gastrointestinal: Negative for abdominal pain, constipation, diarrhea and vomiting.   Genitourinary: Negative for difficulty urinating, dysuria and hematuria.   Musculoskeletal: Positive for back pain.   Neurological: Positive for weakness (right sided) and numbness (medial right foot).   All other systems reviewed and are negative.    Allergies:  Adhesive Tape    Medications:  Fluoxetine  Gabapentin  Glatiramer  Sertraline  Venlafaxine     Past Medical History:     Anxiety state  Bartholin gland cyst  Depression  Gestational  diabetes  Hemorrhoids  Intentional drug overdose  Multiple Sclerosis  Nonallopathic spinal lesions  Sciatica   Trigeminal neuralgia  Vitamin D deficiency     Past Surgical History:    I & D bartholin cyst removal  Strabismus surgery, right eye  Lumbar fusion  Laboral repairs, bilateral  Benign gluteal tumor removal, left  Arthroscopy hip with labral repair, bilateral     Family History:    Father- depression, diabetes, heart disease, basal cell carcinoma  Mother- hypertension, hyperlipidemia    Social History:  Presents alone  Dropped off via private vehicle  History of tobacco use disorder    Physical Exam     Patient Vitals for the past 24 hrs:   BP Temp Temp src Pulse Resp SpO2   06/05/22 1524 119/68 -- -- 53 -- 97 %   06/05/22 1232 129/81 97.3  F (36.3  C) Temporal 75 18 97 %       Physical Exam  Vitals and nursing note reviewed.   Constitutional:       Appearance: Normal appearance.   HENT:      Head: Normocephalic and atraumatic.      Right Ear: External ear normal.      Left Ear: External ear normal.      Nose: Nose normal.      Mouth/Throat:      Mouth: Mucous membranes are moist.   Eyes:      Extraocular Movements: Extraocular movements intact.      Conjunctiva/sclera: Conjunctivae normal.   Cardiovascular:      Rate and Rhythm: Normal rate and regular rhythm.      Heart sounds: No murmur heard.  Pulmonary:      Effort: Pulmonary effort is normal. No respiratory distress.      Breath sounds: Normal breath sounds. No wheezing, rhonchi or rales.   Abdominal:      General: Abdomen is flat. Bowel sounds are normal. There is no distension.      Palpations: Abdomen is soft.      Tenderness: There is no abdominal tenderness. There is no guarding or rebound.   Musculoskeletal:         General: No deformity or signs of injury.      Cervical back: Normal range of motion and neck supple.      Lumbar back: Tenderness (over R PSIS) present. Decreased range of motion. Negative right straight leg raise test.         Back:    Skin:     General: Skin is warm and dry.      Findings: No rash.   Neurological:      Mental Status: She is alert and oriented to person, place, and time.      Sensory: Sensation is intact.      Motor: No weakness.   Psychiatric:         Mood and Affect: Mood normal.         Behavior: Behavior normal.         Emergency Department Course   Imaging:  Lumbar spine XR, 2-3 views   Final Result   IMPRESSION: Nomenclature is based on five lumbar vertebral bodies. Slight levoconvex curvature at the level of the thoracolumbar junction. Alignment otherwise appears normal. No gross vertebral body height loss. Redemonstrated postsurgical changes status    post posterior decompression and interbody and posterior instrumented spinal fusion at L4-L5. No definite radiographic findings of hardware loosening or failure. The other visualized intervertebral disc spaces appear relatively maintained in height.    Hypertrophic changes of the lumbar facet joints again noted. Tiny calcified phleboliths project over the pelvis.           Report per radiology    Emergency Department Course:    Reviewed:  I reviewed nursing notes, vitals, past medical history and Care Everywhere    Assessments:  1347 I obtained history and examined the patient as noted above.   1512 I rechecked the patient and explained findings.     Interventions:  1409 Oxycodone, 10 mg, PO    Disposition:  The patient was discharged to home.     Impression & Plan       Medical Decision Makin yo F with low back pain rad into the RLE.  Suspect radiculopathy poss from disk herniation, arthritis, etc.  No neuro deficits on exam.  No red flags for infection, malignancy, cord compression or cauda equina syndrome.  Will treat her pain and check XR to ensure her hardware looks ok, then plan for home with pain meds and steroid taper.  F/u closely with PCP and discussed return precautions.         Diagnosis:    ICD-10-CM    1. Acute right-sided low back pain with  right-sided sciatica  M54.41        Discharge Medications:   Details   oxyCODONE (ROXICODONE) 5 MG tablet Take 1-2 tablets (5-10 mg) by mouth every 6 hours as needed for pain, Disp-8 tablet, R-0, E-Prescribe      predniSONE (DELTASONE) 10 MG tablet Take 40 mg PO x 2 days, then 30 mg PO x 2 days, then 20 mg PO x 2 days, then 10 mg PO x 2 days, Disp-20 tablet, R-0, E-Prescribe       Scribe Disclosure:  I, Jossie Mace, am serving as a scribe at 1:47 PM on 6/5/2022 to document services personally performed by Adonis Brown MD based on my observations and the provider's statements to me.     I, Felix Kahn, am serving as a scribe  on 6/5/2022 at 4:58 PM to document services personally performed by Adonis Brown MD based on my observations and the provider's statements to me.          Adonis Brown MD  06/05/22 1936